# Patient Record
(demographics unavailable — no encounter records)

---

## 2025-03-13 NOTE — PHYSICAL EXAM
[FreeTextEntry1] : General: this is a pleasant patient in no acute distress   HEENT conjunctiva are normal, no tenderness in head   CV: normal pulses, regular rate and rhythm, no peripheral edema noted   Lungs: breathing is non-labored   abd: soft and non-distended   MSK: SLR: MEENAKSHI: range of motion: tinnels: spurling: Occipital nerve tenderness:   Mental status: Alert and oriented to person, place and time, normal speech and comprehension   Cranial Nerves: extra-occular movements in tact without nystagmus, normal saccades and smooth pursuit, Face symmetric and facial strength symmetric, facial sensation symmetric,   Motor: normal bulk and tone throughout. no abnormal movements.  Full 5/5 strength uppers and lower extremities proximally and distally   Sensory: in tact and symmetric to vibration, light tough, temperature    Cerebellar: normal finger-nose-finger bilaterally   Reflexes: 2+ in the upper and lower extremities and symmetric.  toes are bilaterally downgoing.   Gait: stable, able to tip toe heel and tandem   Stances: Romberg: normal

## 2025-03-13 NOTE — CONSULT LETTER
[Dear  ___] : Dear  [unfilled], [Courtesy Letter:] : I had the pleasure of seeing your patient, [unfilled], in my office today. [Please see my note below.] : Please see my note below. [Consult Closing:] : Thank you very much for allowing me to participate in the care of this patient.  If you have any questions, please do not hesitate to contact me. [Sincerely,] : Sincerely, [FreeTextEntry3] : Alvaro Barron MD

## 2025-03-13 NOTE — HISTORY OF PRESENT ILLNESS
[FreeTextEntry1] : ALEXANDER SPIVEY is a 75 year who returns to follow up for polyneuropathy.   Last visit was 6/7/24 with Dr. Evans. Numbness/tingling in the toes was the same as the year prior, no problems with balance, restless leg sensation had resolved, was still taking alpha lipoic acid 600mg daily.   Since last visit,  -Patient fell off a ladder in November 2024 and hit a wood floor. Did hit head but no LOC. R buttock bruised. 10 days later, patient started having headaches. Headaches were described as moderate intensity and throbbing. Was taking tylenol & ibuprofen very often with no relief. Had a MRI done which was normal. Patient's PCP told him it could be reverberation headaches and recommended he stop taking tylenol and advil for 1 week. Headaches improved. Now gets headaches only occasionally, reports 0 headaches in Feb.   -R foot fell asleep, Feb 20th, stood up and fell over, no head hit, strained R ankle, getting better slowly marcello lipoic 600mg- 3 200mg from [facility] [place] in the am, has been helping, generally feeling fine.  Dr. danielle diagnosed restless leg, takes iron supplement 17mg PRN, no symptoms. somnigaurad, positional pillow, has sleep apnea but does not use cpap Hx of B12 deficiency. Patients daughter also has a B12 deficiency

## 2025-04-29 NOTE — HISTORY OF PRESENT ILLNESS
[FreeTextEntry1] : 76 y/o M w/ history of PTC s/p TT, BPD on Lithium, hypogonadism on TRT  initial evaluation and management generally feels well and endorses no acute complaints. reports diagnosis in 2016 of MNG, s/p TT which revealed multifocal papillary thyroid cancer including a 3.5 cm lesion with insular growth pattern and TP 53 mutation status post total thyroidectomy with no extrathyroidal extension, no angioinvasion, no lymphovascular invasion or perineural invasion and negative margins. Thyroid cancer treated with I-131 in 2016 (120 mCI). post treatment TGB has been <0.2, US of the neck w/o locorregional recurrence, last in 2019. compliant w/ LT4 225 mcg (recently reduced from 250 mcg 3 months ago).   4/2025 Here for /fu, generally feels well and endorses no acute complaints. No interval events since LV. Today reports compliance w/ meds as instructed, reports issues w/ sleep, has been told of restless leg syndrome and mild SCOTT in the past. notes ongoing issues w/ sleep he otherwise denies any f/c, CP, SOB, palpitations, tremors, depressed mood, anxiety, palpitations, n/v, stool/urinary abn, skin/weight changes, heat/cold intolerance, HAs, breast/nipple changes, polyuria/polydipsia/nocturia or other complaints. Face Mask

## 2025-04-29 NOTE — ASSESSMENT
[FreeTextEntry1] : PTC initially w/ intermediate risk of recurrence, s/p TT and GONZALEZ 120 mCi in 2016. now w. no evidence of recurrence. Cont LT4. repeat TGB today. 1/2024 US of the neck for surveillance w/o evidence of structural recurrence. TSH target ~1 at this time. reduced LT4 to 175 mcg. reoeat US in 1/2026  sleep ric reviewed, r/o electrolyte imbalance as cause behind RLS, f/u w/ sleep specialist reinforced.